# Patient Record
Sex: FEMALE | Race: WHITE | NOT HISPANIC OR LATINO | Employment: PART TIME | URBAN - METROPOLITAN AREA
[De-identification: names, ages, dates, MRNs, and addresses within clinical notes are randomized per-mention and may not be internally consistent; named-entity substitution may affect disease eponyms.]

---

## 2017-12-20 ENCOUNTER — OFFICE VISIT (OUTPATIENT)
Dept: URGENT CARE | Facility: CLINIC | Age: 22
End: 2017-12-20
Payer: COMMERCIAL

## 2017-12-20 ENCOUNTER — APPOINTMENT (OUTPATIENT)
Dept: LAB | Facility: HOSPITAL | Age: 22
End: 2017-12-20
Attending: FAMILY MEDICINE
Payer: COMMERCIAL

## 2017-12-20 DIAGNOSIS — L02.214 CUTANEOUS ABSCESS OF GROIN: ICD-10-CM

## 2017-12-20 PROCEDURE — 87186 SC STD MICRODIL/AGAR DIL: CPT

## 2017-12-20 PROCEDURE — 87205 SMEAR GRAM STAIN: CPT

## 2017-12-20 PROCEDURE — 87147 CULTURE TYPE IMMUNOLOGIC: CPT

## 2017-12-20 PROCEDURE — 99203 OFFICE O/P NEW LOW 30 MIN: CPT

## 2017-12-20 PROCEDURE — 87070 CULTURE OTHR SPECIMN AEROBIC: CPT

## 2017-12-21 NOTE — PROGRESS NOTES
Assessment   1  Abscess of right groin (682 2) (L02 214)    Plan   Abscess of right groin    · Sulfamethoxazole-Trimethoprim 800-160 MG Oral Tablet (Bactrim DS); TAKE 1    TABLET TWICE DAILY WITH FOOD   · (1) WOUND CULTURE; Status:Active - Retrospective By Protocol Authorization; Requested for:[id number];     Discussion/Summary   Discussion Summary:    A culture from the abscess site will be sent to the lab  You will be contacted with the results  Warm compresses to the area at least 3 to 4 times a day  dressing changes  Bactrim as directed  Medication Side Effects Reviewed: Possible side effects of new medications were reviewed with the patient/guardian today  Understands and agrees with treatment plan: The treatment plan was reviewed with the patient/guardian  The patient/guardian understands and agrees with the treatment plan    Follow Up Instructions: Follow Up with your Primary Care Provider in prn days  If your symptoms worsen, go to the nearest [place] [address] Emergency Department  Chief Complaint   1  Skin Wound  Chief Complaint Free Text Note Form: Patient relates started with an abscess to right groin x3 days ago  Denies fever  + chills  No prior history of MRSA  Relates abscess popped open today  History of Present Illness   HPI: Patient presents today with complaints of a abscess in her right groin area that is been present for 3 days  She states this morning upon awakening it had popped open and has been draining  She now presents for evaluation  She has had no previous history of any abscesses  She denies any fevers  She states it feels better since his open but is concerned because it was draining  Hospital Based Practices Required Assessment: Reason DV Screen not done: mother at bedside       Depression And Suicide Screen  Reason suicide screen not done: mother at bedside  Prefered Language is  english  Primary Language is  english        Review of Systems Focused-Female:      Constitutional: No fever, no chills, feels well, no tiredness, no recent weight gain or loss  ENT: no ear ache, no loss of hearing, no nosebleeds or nasal discharge, no sore throat or hoarseness  Cardiovascular: no complaints of slow or fast heart rate, no chest pain, no palpitations, no leg claudication or lower extremity edema  Respiratory: no complaints of shortness of breath, no wheezing, no dyspnea on exertion, no orthopnea or PND  Gastrointestinal: no complaints of abdominal pain, no constipation, no nausea or diarrhea, no vomiting, no bloody stools  Genitourinary: no complaints of dysuria, no incontinence, no pelvic pain, no dysmenorrhea, no vaginal discharge or abnormal vaginal bleeding  Integumentary: skin wound-- and-- Abscess right groin  Active Problems   1  Abscess of right groin (682 2) (L02 214)    Past Medical History   1  No pertinent past medical history  Active Problems And Past Medical History Reviewed: The active problems and past medical history were reviewed and updated today  Family History   Mother    1  Family history of Type 2 diabetes mellitus without complication  Family History Reviewed: The family history was reviewed and updated today  Social History    · Never smoker  Social History Reviewed: The social history was reviewed and updated today  Surgical History   1  Denied: History Of Prior Surgery  Surgical History Reviewed: The surgical history was reviewed and updated today  Current Meds    1  No Reported Medications Recorded  Medication List Reviewed: The medication list was reviewed and updated today  Allergies   1   No Known Drug Allergies    Vitals   Signs   Recorded: [id number] 04:21PM   Temperature: 98 3 F, Tympanic  Heart Rate: 93  Respiration: 18  Systolic: 966, LUE, Sitting  Diastolic: 76, LUE, Sitting  Weight: 204 lb 6 oz  O2 Saturation: 97, RA  Pain Scale: 5    Physical Exam Constitutional      General appearance: No acute distress, well appearing and well nourished  Pulmonary      Respiratory effort: No increased work of breathing or signs of respiratory distress  Auscultation of lungs: Clear to auscultation  Cardiovascular      Palpation of heart: Normal PMI, no thrills  Auscultation of heart: Normal rate and rhythm, normal S1 and S2, without murmurs  Abdomen      Abdomen: Non-tender, no masses  Liver and spleen: No hepatomegaly or splenomegaly  Skin      Skin and subcutaneous tissue: Abnormal  -- open abscess right groin area  Scant drainage at this time  No significant induration  Abscess spontaneously drained        Signatures    Electronically signed by : Martha Louis DO; Dec 20 2017  4:38PM EST                       (Author)

## 2017-12-23 LAB
BACTERIA WND AEROBE CULT: ABNORMAL
BACTERIA WND AEROBE CULT: ABNORMAL
GRAM STN SPEC: ABNORMAL

## 2018-01-23 VITALS
DIASTOLIC BLOOD PRESSURE: 76 MMHG | WEIGHT: 204.38 LBS | OXYGEN SATURATION: 97 % | SYSTOLIC BLOOD PRESSURE: 134 MMHG | HEART RATE: 93 BPM | TEMPERATURE: 98.3 F | RESPIRATION RATE: 18 BRPM

## 2020-11-17 DIAGNOSIS — Z20.828 EXPOSURE TO SARS-ASSOCIATED CORONAVIRUS: ICD-10-CM

## 2020-11-17 PROCEDURE — U0003 INFECTIOUS AGENT DETECTION BY NUCLEIC ACID (DNA OR RNA); SEVERE ACUTE RESPIRATORY SYNDROME CORONAVIRUS 2 (SARS-COV-2) (CORONAVIRUS DISEASE [COVID-19]), AMPLIFIED PROBE TECHNIQUE, MAKING USE OF HIGH THROUGHPUT TECHNOLOGIES AS DESCRIBED BY CMS-2020-01-R: HCPCS | Performed by: FAMILY MEDICINE

## 2020-11-18 LAB — SARS-COV-2 RNA SPEC QL NAA+PROBE: NOT DETECTED
